# Patient Record
Sex: MALE | Race: WHITE | NOT HISPANIC OR LATINO | Employment: FULL TIME | URBAN - METROPOLITAN AREA
[De-identification: names, ages, dates, MRNs, and addresses within clinical notes are randomized per-mention and may not be internally consistent; named-entity substitution may affect disease eponyms.]

---

## 2021-08-18 ENCOUNTER — TRANSCRIBE ORDERS (OUTPATIENT)
Dept: ADMINISTRATIVE | Facility: HOSPITAL | Age: 51
End: 2021-08-18

## 2021-08-18 ENCOUNTER — LAB (OUTPATIENT)
Dept: LAB | Facility: HOSPITAL | Age: 51
End: 2021-08-18

## 2021-08-18 ENCOUNTER — HOSPITAL ENCOUNTER (OUTPATIENT)
Dept: CARDIOLOGY | Facility: HOSPITAL | Age: 51
Discharge: HOME | End: 2021-08-18

## 2021-08-18 DIAGNOSIS — N20.0 RENAL CALCULUS: ICD-10-CM

## 2021-08-18 DIAGNOSIS — B99.9 BLOOD INFECTION: ICD-10-CM

## 2021-08-18 DIAGNOSIS — N20.0 RENAL CALCULUS: Primary | ICD-10-CM

## 2021-08-18 DIAGNOSIS — B99.9 BLOOD INFECTION: Primary | ICD-10-CM

## 2021-08-18 LAB
ABO GROUP BLD: NORMAL
ANION GAP SERPL CALCULATED.3IONS-SCNC: 9.5 MMOL/L (ref 5–15)
BASOPHILS # BLD AUTO: 0.04 10*3/MM3 (ref 0–0.2)
BASOPHILS NFR BLD AUTO: 0.6 % (ref 0–1.5)
BLD GP AB SCN SERPL QL: NEGATIVE
BUN SERPL-MCNC: 18 MG/DL (ref 6–20)
BUN/CREAT SERPL: 17 (ref 7–25)
CALCIUM SPEC-SCNC: 9.7 MG/DL (ref 8.6–10.5)
CHLORIDE SERPL-SCNC: 103 MMOL/L (ref 98–107)
CO2 SERPL-SCNC: 25.5 MMOL/L (ref 22–29)
CREAT SERPL-MCNC: 1.06 MG/DL (ref 0.76–1.27)
DEPRECATED RDW RBC AUTO: 47.1 FL (ref 37–54)
EOSINOPHIL # BLD AUTO: 0.2 10*3/MM3 (ref 0–0.4)
EOSINOPHIL NFR BLD AUTO: 3.1 % (ref 0.3–6.2)
ERYTHROCYTE [DISTWIDTH] IN BLOOD BY AUTOMATED COUNT: 15.9 % (ref 12.3–15.4)
GFR SERPL CREATININE-BSD FRML MDRD: 73 ML/MIN/1.73
GLUCOSE SERPL-MCNC: 93 MG/DL (ref 65–99)
HCT VFR BLD AUTO: 38.2 % (ref 37.5–51)
HGB BLD-MCNC: 12.8 G/DL (ref 13–17.7)
IMM GRANULOCYTES # BLD AUTO: 0.02 10*3/MM3 (ref 0–0.05)
IMM GRANULOCYTES NFR BLD AUTO: 0.3 % (ref 0–0.5)
LYMPHOCYTES # BLD AUTO: 1.67 10*3/MM3 (ref 0.7–3.1)
LYMPHOCYTES NFR BLD AUTO: 26.1 % (ref 19.6–45.3)
MCH RBC QN AUTO: 27.6 PG (ref 26.6–33)
MCHC RBC AUTO-ENTMCNC: 33.5 G/DL (ref 31.5–35.7)
MCV RBC AUTO: 82.5 FL (ref 79–97)
MONOCYTES # BLD AUTO: 0.74 10*3/MM3 (ref 0.1–0.9)
MONOCYTES NFR BLD AUTO: 11.6 % (ref 5–12)
NEUTROPHILS NFR BLD AUTO: 3.73 10*3/MM3 (ref 1.7–7)
NEUTROPHILS NFR BLD AUTO: 58.3 % (ref 42.7–76)
NRBC BLD AUTO-RTO: 0 /100 WBC (ref 0–0.2)
PLATELET # BLD AUTO: 250 10*3/MM3 (ref 140–450)
PMV BLD AUTO: 9.9 FL (ref 6–12)
POTASSIUM SERPL-SCNC: 4 MMOL/L (ref 3.5–5.2)
RBC # BLD AUTO: 4.63 10*6/MM3 (ref 4.14–5.8)
RH BLD: POSITIVE
SODIUM SERPL-SCNC: 138 MMOL/L (ref 136–145)
T&S EXPIRATION DATE: NORMAL
WBC # BLD AUTO: 6.4 10*3/MM3 (ref 3.4–10.8)

## 2021-08-18 PROCEDURE — 86900 BLOOD TYPING SEROLOGIC ABO: CPT

## 2021-08-18 PROCEDURE — 86901 BLOOD TYPING SEROLOGIC RH(D): CPT

## 2021-08-18 PROCEDURE — 93005 ELECTROCARDIOGRAM TRACING: CPT | Performed by: UROLOGY

## 2021-08-18 PROCEDURE — 80048 BASIC METABOLIC PNL TOTAL CA: CPT

## 2021-08-18 PROCEDURE — 85025 COMPLETE CBC W/AUTO DIFF WBC: CPT

## 2021-08-18 PROCEDURE — 93010 ELECTROCARDIOGRAM REPORT: CPT | Performed by: INTERNAL MEDICINE

## 2021-08-18 PROCEDURE — 36415 COLL VENOUS BLD VENIPUNCTURE: CPT

## 2021-08-18 PROCEDURE — 87040 BLOOD CULTURE FOR BACTERIA: CPT

## 2021-08-18 PROCEDURE — 86850 RBC ANTIBODY SCREEN: CPT

## 2021-08-19 LAB — QT INTERVAL: 393 MS

## 2021-08-23 LAB
BACTERIA SPEC AEROBE CULT: NORMAL
BACTERIA SPEC AEROBE CULT: NORMAL

## 2021-08-25 PROCEDURE — 82365 CALCULUS SPECTROSCOPY: CPT | Performed by: UROLOGY

## 2021-08-26 ENCOUNTER — LAB REQUISITION (OUTPATIENT)
Dept: LAB | Facility: HOSPITAL | Age: 51
End: 2021-08-26

## 2021-08-26 DIAGNOSIS — N20.0 CALCULUS OF KIDNEY: ICD-10-CM

## 2021-09-01 LAB
CALCIUM OXALATE DIHYDRATE MFR STONE IR: 20 %
COLOR STONE: NORMAL
COM MFR STONE: 80 %
COMPN STONE: NORMAL
LABORATORY COMMENT REPORT: NORMAL
Lab: NORMAL
Lab: NORMAL
PHOTO: NORMAL
SIZE STONE: NORMAL MM
SPEC SOURCE SUBJ: NORMAL
STONE ANALYSIS-IMP: NORMAL
WT STONE: 1302 MG

## 2022-04-21 ENCOUNTER — OFFICE VISIT (OUTPATIENT)
Dept: CARDIOLOGY | Facility: CLINIC | Age: 52
End: 2022-04-21

## 2022-04-21 VITALS
DIASTOLIC BLOOD PRESSURE: 64 MMHG | BODY MASS INDEX: 37.19 KG/M2 | SYSTOLIC BLOOD PRESSURE: 101 MMHG | WEIGHT: 315 LBS | HEIGHT: 77 IN | HEART RATE: 104 BPM | OXYGEN SATURATION: 96 %

## 2022-04-21 DIAGNOSIS — I44.7 LBBB (LEFT BUNDLE BRANCH BLOCK): ICD-10-CM

## 2022-04-21 DIAGNOSIS — I10 PRIMARY HYPERTENSION: ICD-10-CM

## 2022-04-21 DIAGNOSIS — N20.0 RENAL CALCULUS: ICD-10-CM

## 2022-04-21 DIAGNOSIS — I42.0 DILATED CARDIOMYOPATHY: ICD-10-CM

## 2022-04-21 DIAGNOSIS — G47.33 OBSTRUCTIVE SLEEP APNEA SYNDROME: ICD-10-CM

## 2022-04-21 DIAGNOSIS — I48.19 PERSISTENT ATRIAL FIBRILLATION: Primary | ICD-10-CM

## 2022-04-21 PROBLEM — Z98.890 HISTORY OF CARDIAC CATHETERIZATION: Status: ACTIVE | Noted: 2022-04-18

## 2022-04-21 PROBLEM — Q23.1 BICUSPID AORTIC VALVE: Status: ACTIVE | Noted: 2021-11-29

## 2022-04-21 PROBLEM — I35.1 AORTIC VALVE REGURGITATION: Status: ACTIVE | Noted: 2021-09-09

## 2022-04-21 PROCEDURE — 99205 OFFICE O/P NEW HI 60 MIN: CPT | Performed by: INTERNAL MEDICINE

## 2022-04-21 PROCEDURE — 93000 ELECTROCARDIOGRAM COMPLETE: CPT | Performed by: INTERNAL MEDICINE

## 2022-04-21 RX ORDER — DILTIAZEM HYDROCHLORIDE 180 MG/1
180 CAPSULE, EXTENDED RELEASE ORAL DAILY
COMMUNITY
Start: 2022-03-09 | End: 2022-04-21

## 2022-04-21 RX ORDER — VENLAFAXINE HYDROCHLORIDE 75 MG/1
2 CAPSULE, EXTENDED RELEASE ORAL DAILY
COMMUNITY
Start: 2021-12-08

## 2022-04-21 RX ORDER — POTASSIUM CITRATE 15 MEQ/1
1 TABLET, EXTENDED RELEASE ORAL 2 TIMES DAILY
COMMUNITY
Start: 2022-03-25

## 2022-04-21 RX ORDER — SACUBITRIL AND VALSARTAN 24; 26 MG/1; MG/1
1 TABLET, FILM COATED ORAL 2 TIMES DAILY
COMMUNITY
Start: 2022-04-08

## 2022-04-21 RX ORDER — METOPROLOL SUCCINATE 100 MG/1
100 TABLET, EXTENDED RELEASE ORAL 2 TIMES DAILY
COMMUNITY
Start: 2022-03-16

## 2022-04-21 RX ORDER — ATORVASTATIN CALCIUM 20 MG/1
20 TABLET, FILM COATED ORAL DAILY
COMMUNITY
Start: 2022-04-05

## 2022-04-21 RX ORDER — APIXABAN 5 MG/1
5 TABLET, FILM COATED ORAL 2 TIMES DAILY
COMMUNITY
Start: 2022-04-05

## 2022-04-21 RX ORDER — FUROSEMIDE 40 MG/1
40 TABLET ORAL 2 TIMES DAILY
COMMUNITY
Start: 2022-04-08

## 2022-04-21 RX ORDER — AMIODARONE HYDROCHLORIDE 200 MG/1
200 TABLET ORAL 2 TIMES DAILY
COMMUNITY
Start: 2022-04-10 | End: 2022-04-21

## 2022-04-21 RX ORDER — PANTOPRAZOLE SODIUM 40 MG/1
40 TABLET, DELAYED RELEASE ORAL DAILY
COMMUNITY
Start: 2022-02-22 | End: 2022-04-21

## 2022-04-21 NOTE — PROGRESS NOTES
CC--cardiomyopathy, persistent atrial fibrillation    Sub  52-year-old male patient started developing symptomatic  atrial fibrillation last year.  He had atrial fibrillation for several years and started become persistent last year.  Patient underwent echocardiography last year back in July with EF more than 50% with a bicuspid aortic valve.  He underwent cardioversion x2 followed by ablation done in December by Dr. Duran and apparently had chest pain post ablation needing readmission following day and was found to have E. coli sepsis from the description though I do not have all the records.  He was briefly admitted after the ablation and further work-up was negative and was sent home.  He was reevaluated in the office of Dr Rice and patient was found to have recurrent persistent atrial fibrillation.  An echocardiogram done in December 2021 showed EF of 38% with ascending aorta measuring between 4.4-4.98 depending on transthoracic versus JUANJO.  He does have underlying bicuspid aortic valve.  A CT chest done in 12/21 revealed ascending aorta  aorta measuring up to 5 cm.    Report of CT chest attached below for reference.    Ct chest report --12/21    IMPRESSION:   Mild calcification of the thoracic aorta with aneurysmal dilatation of the   ascending thoracic aorta of 5 cm   No convincing CT evidence of pulmonary emboli.   Small right and trace left bilateral effusions with patchy bibasilar areas of   atelectasis/scar versus less infiltrate.   Scattered small mediastinal lymph nodes with calcified left hilar lymph nodes.   Mild diffuse fatty infiltration of the visualized liver.        Because of persistent atrial fibrillation and progressive heart failure symptoms patient underwent cardiac catheterization which showed EF of 25 to 30% without significant coronary artery stenosis with normal right-sided pressures.  Patient was started on amiodarone 2 weeks ago apart from Jardiance and spironolactone.  He continues  to have class III dyspnea symptoms        Past Medical History:   Diagnosis Date   • Atrial fibrillation (HCC)    • CHF (congestive heart failure) (HCC)    • Kidney stones      Past Surgical History:   Procedure Laterality Date   • CARDIAC ABLATION      [employer]   • CARDIAC CATHETERIZATION     • CYSTOSCOPY W/ LASER LITHOTRIPSY     • HERNIA REPAIR     • INNER EAR SURGERY     • TONSILLECTOMY       Family History   Problem Relation Age of Onset   • Arrhythmia Father    • Hyperlipidemia Father    • Hypertension Father      Social History     Tobacco Use   • Smoking status: Former Smoker     Types: Cigarettes     Quit date: 1/31/2007     Years since quitting: 15.2   • Smokeless tobacco: Never Used   Vaping Use   • Vaping Use: Never used   Substance Use Topics   • Alcohol use: Yes     Comment: occasionally   • Drug use: Not Currently     Allergies:  Patient has no known allergies.    Review of Systems   General:  positive for fatigue and tiredness  Eyes: No redness  Cardiovascular: No chest pain, no palpitations  Respiratory:   positive for class 3 shortness of breath  Gastrointestinal: No nausea or vomiting, bleeding  Genitourinary: no hematuria or dysuria  Musculoskeletal: No arthralgia or myalgia  Skin: No rash  Neurologic: No numbness, tingling, syncope  Hematologic/Lymphatic: No abnormal bleeding      Physical Exam  VITALS REVIEWED    General:      well developed, well nourished, in no acute distress.    Head:      normocephalic and atraumatic.    Eyes:      PERRL/EOM intact, conjunctivae and sclerae clear without nystagmus.    Neck:      no masses, thyromegaly,  trachea central with normal respiratory effort and PMI displaced laterally  Lungs:      clear bilaterally to auscultation.    Heart:       underlying  atrial fibrillation with irregularly irregular rhythm and  without any murmurs, gallops or rubs  Msk:      no deformity or scoliosis noted of thoracic or lumbar spine.    Pulses:      pulses normal in all 4  extremities.    Extremities:       no cyanosis or clubbing--trace left pedal edema and trace right pedal edema.    Neurologic:      no focal deficits.   alert oriented x3  Skin:      intact without lesions or rashes.    Psych:      alert and cooperative; normal mood and affect; normal attention span and concentration.            Assessment and plan      Complex patient with multiple medical problems    Records from [facility] and [facility], cardiology notes and multiple investigations were extensively reviewed on this patient.    Progressive LV dysfunction without significant coronary artery stenosis  Progressive interventricular conduction delay with left bundle branch block on ECG today with a normal QRS last year.  Normal EF back in July 21 progressed to LV dysfunction of EF of 38% in December 2021 and currently EF less than 30%.  Ongoing chronic class III systolic heart failure symptoms.  Obesity with obstructive sleep apnea on therapy.  Confounding factors with associated bicuspid aortic valve and ascending aorta measuring up to 5 cm in CT chest.  Essential hypertension controlled  Hyperlipidemia under treatment  [id number] score--- congestive heart failure--chronic class III, hypertension, ascending aorta disease--3    Recommendations    Stop amiodarone  Repeat cardioversion for symptomatic relief planned with cardiology next week  Possible consultation with CT surgery because of ascending aorta measurement of 5 cm with bicuspid aortic valve discussed with   Prior AF ablation back in December with recurrent persistent symptomatic atrial fibrillation  Reevaluate this patient closely in few weeks and considered for redo AF ablation to resume sinus rhythm and potentially improve  his LV systolic function  Continue LifeVest in the interim    Extensive discussion was done with the patient and the wife and extensive education done in the office    May end up needing a cardiac MRI to evaluate  for any infiltrative cardiomyopathy or granulomatous cardiomyopathy if his LV function is persistent and has progressive conduction system disease without improvement of EF despite being in sinus rhythm.      ECG 12 Lead    Date/Time: 4/21/2022 5:08 PM  Performed by: Eyad Campos MD  Authorized by: Eyad Campos MD   Comparison: compared with previous ECG   Comparison to previous ECG: Compared to  previous EKG when patient was in atrial fibrillation with normal QRS current EKG shows atrial fibrillation with left bundle branch block            Electronically signed by Eyad Campos MD, 04/21/22, 5:07 PM EDT.

## 2022-05-26 ENCOUNTER — OFFICE VISIT (OUTPATIENT)
Dept: CARDIOLOGY | Facility: CLINIC | Age: 52
End: 2022-05-26

## 2022-05-26 VITALS
DIASTOLIC BLOOD PRESSURE: 65 MMHG | RESPIRATION RATE: 18 BRPM | OXYGEN SATURATION: 97 % | WEIGHT: 315 LBS | HEART RATE: 70 BPM | BODY MASS INDEX: 38.36 KG/M2 | HEIGHT: 76 IN | SYSTOLIC BLOOD PRESSURE: 104 MMHG

## 2022-05-26 DIAGNOSIS — I44.7 LBBB (LEFT BUNDLE BRANCH BLOCK): ICD-10-CM

## 2022-05-26 DIAGNOSIS — I48.19 PERSISTENT ATRIAL FIBRILLATION: Primary | ICD-10-CM

## 2022-05-26 DIAGNOSIS — G47.33 OBSTRUCTIVE SLEEP APNEA SYNDROME: ICD-10-CM

## 2022-05-26 DIAGNOSIS — I10 PRIMARY HYPERTENSION: ICD-10-CM

## 2022-05-26 DIAGNOSIS — I42.0 DILATED CARDIOMYOPATHY: ICD-10-CM

## 2022-05-26 PROCEDURE — 93000 ELECTROCARDIOGRAM COMPLETE: CPT | Performed by: INTERNAL MEDICINE

## 2022-05-26 PROCEDURE — 99214 OFFICE O/P EST MOD 30 MIN: CPT | Performed by: INTERNAL MEDICINE

## 2022-05-26 NOTE — PROGRESS NOTES
CC--cardiomyopathy, persistent atrial fibrillation    Sub  52-year-old male patient started developing symptomatic  atrial fibrillation last year.  He had atrial fibrillation for several years and started become persistent last year.  Patient underwent echocardiography last year back in July with EF more than 50% with a bicuspid aortic valve.  He underwent cardioversion x2 followed by ablation done in December by Dr. Duran and apparently had chest pain post ablation needing readmission following day and was found to have E. coli sepsis from the description though I do not have all the records.  He was briefly admitted after the ablation and further work-up was negative and was sent home.  He was reevaluated in the office of Dr Rice and patient was found to have recurrent persistent atrial fibrillation.  An echocardiogram done in December 2021 showed EF of 38% with ascending aorta measuring between 4.4-4.98 depending on transthoracic versus JUANJO.  He does have underlying bicuspid aortic valve.  A CT chest done in 12/21 revealed ascending aorta  aorta measuring up to 5 cm.    Report of CT chest attached below for reference.    Ct chest report --12/21    IMPRESSION:   Mild calcification of the thoracic aorta with aneurysmal dilatation of the   ascending thoracic aorta of 5 cm   No convincing CT evidence of pulmonary emboli.   Small right and trace left bilateral effusions with patchy bibasilar areas of   atelectasis/scar versus less infiltrate.   Scattered small mediastinal lymph nodes with calcified left hilar lymph nodes.   Mild diffuse fatty infiltration of the visualized liver.        Because of persistent atrial fibrillation and progressive heart failure symptoms patient underwent cardiac catheterization which showed EF of 25 to 30% without significant coronary artery stenosis with normal right-sided pressures.  Patient's  amiodarone  Stopped and underwent cardioversion to sinus and feels better and is on  Jardiance and spironolactone.  He continues to have class 2 dyspnea symptoms        Past Medical History:   Diagnosis Date   • Atrial fibrillation (HCC)    • CHF (congestive heart failure) (HCC)    • Kidney stones      Past Surgical History:   Procedure Laterality Date   • CARDIAC ABLATION      Orthodox   • CARDIAC CATHETERIZATION     • CYSTOSCOPY W/ LASER LITHOTRIPSY     • HERNIA REPAIR     • INNER EAR SURGERY     • TONSILLECTOMY       Family History   Problem Relation Age of Onset   • Arrhythmia Father    • Hyperlipidemia Father    • Hypertension Father      Social History     Tobacco Use   • Smoking status: Former Smoker     Types: Cigarettes     Quit date: 1/31/2007     Years since quitting: 15.2   • Smokeless tobacco: Never Used   Vaping Use   • Vaping Use: Never used   Substance Use Topics   • Alcohol use: Yes     Comment: occasionally   • Drug use: Not Currently     Allergies:  Patient has no known allergies.    Review of Systems   General:  positive for fatigue and tiredness  Eyes: No redness  Cardiovascular: No chest pain, no palpitations  Respiratory:   positive for class 2 shortness of breath        Physical Exam  VITALS REVIEWED    General:      well developed, well nourished, in no acute distress.    Head:      normocephalic and atraumatic.    Eyes:      PERRL/EOM intact, conjunctivae and sclerae clear without nystagmus.    Neck:      no masses, thyromegaly,  trachea central with normal respiratory effort and PMI displaced laterally  Lungs:      clear bilaterally to auscultation.    Heart:       Sinus rhythm and  without any murmurs, gallops or rubs  Msk:      no deformity or scoliosis noted of thoracic or lumbar spine.    Pulses:      pulses normal in all 4 extremities.    Extremities:       no cyanosis or clubbing--trace left pedal edema and trace right pedal edema.    Neurologic:      no focal deficits.   alert oriented x3  Skin:      intact without lesions or rashes.    Psych:      alert and  cooperative; normal mood and affect; normal attention span and concentration.            Assessment and plan      Complex patient with multiple medical problems    Records from CT surgery reviewed  Progressive LV dysfunction without significant coronary artery stenosis  Progressive interventricular conduction delay with left bundle branch block on ECG today with a normal QRS last year.  Normal EF back in July 21 progressed to LV dysfunction of EF of 38% in December 2021 and currently EF less than 30%.  Ongoing chronic class III systolic heart failure symptoms.  Obesity with obstructive sleep apnea on therapy.  Confounding factors with associated bicuspid aortic valve and ascending aorta measuring up to 5 cm in CT chest.  Essential hypertension controlled  Hyperlipidemia under treatment  [id number] score--- congestive heart failure--chronic class III, hypertension, ascending aorta disease--3  Post cardioversion to sinus with improvement of sx  Prior AF ablation back in December with recurrent persistent symptomatic atrial fibrillation  Check EF in sinus  Continue LifeVest in the interim  check a cardiac MRI to evaluate for any infiltrative cardiomyopathy or granulomatous cardiomyopathy and has progressive conduction system disease   Decide on CRT depending on MRI EF      ECG 12 Lead    Date/Time: 5/26/2022 4:01 PM  Performed by: Eyad Campos MD  Authorized by: Eyad Campos MD   Comparison: compared with previous ECG   Comparison to previous ECG: AF changed to sinus  Rhythm: sinus rhythm  Conduction: left bundle branch block          Electronically signed by Eyad Campos MD, 05/26/22, 4:01 PM EDT.

## 2022-06-07 ENCOUNTER — TELEPHONE (OUTPATIENT)
Dept: CARDIOLOGY | Facility: CLINIC | Age: 52
End: 2022-06-07

## 2022-06-07 NOTE — TELEPHONE ENCOUNTER
Dr. Campos,    This patient has been scheduled for a Cardiac MRI on 06/13/2022 @ 9:45am.     Can you please place a Lab order for a BMP and contact patient when to go to the lab.    Thank you,    [facility]  [facility]     [phone]

## 2022-06-08 DIAGNOSIS — I50.9 CONGESTIVE HEART FAILURE, UNSPECIFIED HF CHRONICITY, UNSPECIFIED HEART FAILURE TYPE: ICD-10-CM

## 2022-06-08 DIAGNOSIS — E78.5 HYPERLIPIDEMIA, UNSPECIFIED HYPERLIPIDEMIA TYPE: ICD-10-CM

## 2022-06-08 DIAGNOSIS — I48.91 ATRIAL FIBRILLATION, UNSPECIFIED TYPE: Primary | ICD-10-CM

## 2022-06-13 ENCOUNTER — APPOINTMENT (OUTPATIENT)
Dept: MRI IMAGING | Facility: HOSPITAL | Age: 52
End: 2022-06-13

## 2022-06-15 ENCOUNTER — HOSPITAL ENCOUNTER (OUTPATIENT)
Dept: MRI IMAGING | Facility: HOSPITAL | Age: 52
Discharge: HOME | End: 2022-06-15
Admitting: INTERNAL MEDICINE

## 2022-06-15 PROCEDURE — 82565 ASSAY OF CREATININE: CPT

## 2022-06-15 PROCEDURE — 0 GADOBENATE DIMEGLUMINE 529 MG/ML SOLUTION: Performed by: INTERNAL MEDICINE

## 2022-06-15 PROCEDURE — A9577 INJ MULTIHANCE: HCPCS | Performed by: INTERNAL MEDICINE

## 2022-06-15 PROCEDURE — 75561 CARDIAC MRI FOR MORPH W/DYE: CPT

## 2022-06-15 PROCEDURE — 75561 CARDIAC MRI FOR MORPH W/DYE: CPT | Performed by: INTERNAL MEDICINE

## 2022-06-15 RX ADMIN — GADOBENATE DIMEGLUMINE 20 ML: 529 INJECTION, SOLUTION INTRAVENOUS at 10:41

## 2022-06-16 LAB — CREAT BLDA-MCNC: 1.3 MG/DL (ref 0.6–1.3)

## 2022-06-27 ENCOUNTER — APPOINTMENT (OUTPATIENT)
Dept: MRI IMAGING | Facility: HOSPITAL | Age: 52
End: 2022-06-27

## 2022-07-07 ENCOUNTER — OFFICE VISIT (OUTPATIENT)
Dept: CARDIOLOGY | Facility: CLINIC | Age: 52
End: 2022-07-07

## 2022-07-07 ENCOUNTER — PREP FOR SURGERY (OUTPATIENT)
Dept: OTHER | Facility: HOSPITAL | Age: 52
End: 2022-07-07

## 2022-07-07 VITALS
OXYGEN SATURATION: 96 % | SYSTOLIC BLOOD PRESSURE: 101 MMHG | BODY MASS INDEX: 38.36 KG/M2 | DIASTOLIC BLOOD PRESSURE: 66 MMHG | WEIGHT: 315 LBS | HEIGHT: 76 IN | HEART RATE: 70 BPM

## 2022-07-07 DIAGNOSIS — I42.0 DILATED CARDIOMYOPATHY: Primary | ICD-10-CM

## 2022-07-07 DIAGNOSIS — I44.7 LBBB (LEFT BUNDLE BRANCH BLOCK): ICD-10-CM

## 2022-07-07 DIAGNOSIS — I48.0 PAROXYSMAL ATRIAL FIBRILLATION: ICD-10-CM

## 2022-07-07 DIAGNOSIS — I10 PRIMARY HYPERTENSION: ICD-10-CM

## 2022-07-07 DIAGNOSIS — I50.9 CONGESTIVE HEART FAILURE, UNSPECIFIED HF CHRONICITY, UNSPECIFIED HEART FAILURE TYPE: ICD-10-CM

## 2022-07-07 PROCEDURE — 99214 OFFICE O/P EST MOD 30 MIN: CPT | Performed by: INTERNAL MEDICINE

## 2022-07-07 PROCEDURE — 93000 ELECTROCARDIOGRAM COMPLETE: CPT | Performed by: INTERNAL MEDICINE

## 2022-07-07 RX ORDER — SODIUM CHLORIDE 0.9 % (FLUSH) 0.9 %
3-10 SYRINGE (ML) INJECTION AS NEEDED
Status: CANCELLED | OUTPATIENT
Start: 2022-07-07

## 2022-07-07 RX ORDER — SODIUM CHLORIDE 0.9 % (FLUSH) 0.9 %
3 SYRINGE (ML) INJECTION EVERY 12 HOURS SCHEDULED
Status: CANCELLED | OUTPATIENT
Start: 2022-07-07

## 2022-07-07 NOTE — PROGRESS NOTES
CC--cardiomyopathy, persistent atrial fibrillation    Sub  52-year-old male patient started developing symptomatic  atrial fibrillation last year.  He had atrial fibrillation for several years and started become persistent last year.  Patient underwent echocardiography last year back in July with EF more than 50% with a bicuspid aortic valve.  He underwent cardioversion x2 followed by ablation done in December by Dr. Duran and apparently had chest pain post ablation needing readmission following day and was found to have E. coli sepsis from the description though I do not have all the records.  He was briefly admitted after the ablation and further work-up was negative and was sent home.  He was reevaluated in the office of Dr Rice and patient was found to have recurrent persistent atrial fibrillation.  An echocardiogram done in December 2021 showed EF of 38% with ascending aorta measuring between 4.4-4.98 depending on transthoracic versus JUANJO.  He does have underlying bicuspid aortic valve.  A CT chest done in 12/21 revealed ascending aorta  aorta measuring up to 5 cm.    Report of CT chest attached below for reference.    Ct chest report --12/21    IMPRESSION:   Mild calcification of the thoracic aorta with aneurysmal dilatation of the   ascending thoracic aorta of 5 cm   No convincing CT evidence of pulmonary emboli.   Small right and trace left bilateral effusions with patchy bibasilar areas of   atelectasis/scar versus less infiltrate.   Scattered small mediastinal lymph nodes with calcified left hilar lymph nodes.   Mild diffuse fatty infiltration of the visualized liver.        Because of persistent atrial fibrillation and progressive heart failure symptoms patient underwent cardiac catheterization which showed EF of 25 to 30% without significant coronary artery stenosis with normal right-sided pressures.  Patient's  amiodarone  Stopped and underwent cardioversion to sinus and feels better and is on  Jardiance and spironolactone.  He continues to have class 2 dyspnea symptoms        Past Medical History:   Diagnosis Date   • Atrial fibrillation (HCC)    • CHF (congestive heart failure) (HCC)    • Kidney stones      Past Surgical History:   Procedure Laterality Date   • CARDIAC ABLATION      [employer]   • CARDIAC CATHETERIZATION     • CYSTOSCOPY W/ LASER LITHOTRIPSY     • HERNIA REPAIR     • INNER EAR SURGERY     • TONSILLECTOMY       Family History   Problem Relation Age of Onset   • Arrhythmia Father    • Hyperlipidemia Father    • Hypertension Father      Social History     Tobacco Use   • Smoking status: Former Smoker     Types: Cigarettes     Quit date: 1/31/2007     Years since quitting: 15.2   • Smokeless tobacco: Never Used   Vaping Use   • Vaping Use: Never used   Substance Use Topics   • Alcohol use: Yes     Comment: occasionally   • Drug use: Not Currently     Allergies:  Patient has no known allergies.    Review of Systems   General:  positive for fatigue and tiredness  Eyes: No redness  Cardiovascular: No chest pain, no palpitations  Respiratory:   positive for class 2 shortness of breath        Physical Exam  VITALS REVIEWED    General:      well developed, well nourished, in no acute distress.    Head:      normocephalic and atraumatic.    Eyes:      PERRL/EOM intact, conjunctivae and sclerae clear without nystagmus.    Neck:      no masses, thyromegaly,  trachea central with normal respiratory effort and PMI displaced laterally  Lungs:      clear bilaterally to auscultation.    Heart:       Sinus rhythm and  without any murmurs, gallops or rubs  Msk:      no deformity or scoliosis noted of thoracic or lumbar spine.    Pulses:      pulses normal in all 4 extremities.    Extremities:       no cyanosis or clubbing--trace left pedal edema and trace right pedal edema.    Neurologic:      no focal deficits.   alert oriented x3  Skin:      intact without lesions or rashes.    Psych:      alert and  cooperative; normal mood and affect; normal attention span and concentration.            Assessment and plan      Complex patient with multiple medical problems    Records from CT surgery reviewed  Progressive LV dysfunction without significant coronary artery stenosis  Progressive interventricular conduction delay with left bundle branch block on ECG today with a normal QRS last year.  Normal EF back in July 21 progressed to LV dysfunction of EF of 38% in December 2021 and currently EF less than 30%.MRI--EF 36%  Ongoing chronic class III systolic heart failure symptoms.improved to class 2   Obesity with obstructive sleep apnea on therapy.  Confounding factors with associated bicuspid aortic valve and ascending aorta measuring up to 5 cm in CT chest.  Essential hypertension controlled  Hyperlipidemia under treatment  [id number] score--- congestive heart failure--chronic class III, hypertension, ascending aorta disease--3  Post cardioversion to sinus with improvement of sx  Prior AF ablation back in December with recurrent persistent symptomatic atrial fibrillation  Check EF in sinus  off LifeVest  Schedule patient for ep study and CRT_P  dw patient about Aziyo trial for the Cangaroo pouch      ECG 12 Lead    Date/Time: 7/7/2022 2:56 PM  Performed by: Eyad Campos MD  Authorized by: Eyad Campos MD   Comparison: compared with previous ECG   Similar to previous ECG  Rhythm: sinus rhythm  Rate: normal  Conduction: left bundle branch block          Electronically signed by Eyad Campos MD, 07/07/22, 2:56 PM EDT.

## 2022-07-07 NOTE — H&P (VIEW-ONLY)
CC--cardiomyopathy, persistent atrial fibrillation    Sub  52-year-old male patient started developing symptomatic  atrial fibrillation last year.  He had atrial fibrillation for several years and started become persistent last year.  Patient underwent echocardiography last year back in July with EF more than 50% with a bicuspid aortic valve.  He underwent cardioversion x2 followed by ablation done in December by Dr. Duran and apparently had chest pain post ablation needing readmission following day and was found to have E. coli sepsis from the description though I do not have all the records.  He was briefly admitted after the ablation and further work-up was negative and was sent home.  He was reevaluated in the office of Dr Rice and patient was found to have recurrent persistent atrial fibrillation.  An echocardiogram done in December 2021 showed EF of 38% with ascending aorta measuring between 4.4-4.98 depending on transthoracic versus JUANJO.  He does have underlying bicuspid aortic valve.  A CT chest done in 12/21 revealed ascending aorta  aorta measuring up to 5 cm.    Report of CT chest attached below for reference.    Ct chest report --12/21    IMPRESSION:   Mild calcification of the thoracic aorta with aneurysmal dilatation of the   ascending thoracic aorta of 5 cm   No convincing CT evidence of pulmonary emboli.   Small right and trace left bilateral effusions with patchy bibasilar areas of   atelectasis/scar versus less infiltrate.   Scattered small mediastinal lymph nodes with calcified left hilar lymph nodes.   Mild diffuse fatty infiltration of the visualized liver.        Because of persistent atrial fibrillation and progressive heart failure symptoms patient underwent cardiac catheterization which showed EF of 25 to 30% without significant coronary artery stenosis with normal right-sided pressures.  Patient's  amiodarone  Stopped and underwent cardioversion to sinus and feels better and is on  Jardiance and spironolactone.  He continues to have class 2 dyspnea symptoms        Past Medical History:   Diagnosis Date   • Atrial fibrillation (HCC)    • CHF (congestive heart failure) (HCC)    • Kidney stones      Past Surgical History:   Procedure Laterality Date   • CARDIAC ABLATION      [employer]   • CARDIAC CATHETERIZATION     • CYSTOSCOPY W/ LASER LITHOTRIPSY     • HERNIA REPAIR     • INNER EAR SURGERY     • TONSILLECTOMY       Family History   Problem Relation Age of Onset   • Arrhythmia Father    • Hyperlipidemia Father    • Hypertension Father      Social History     Tobacco Use   • Smoking status: Former Smoker     Types: Cigarettes     Quit date: 1/31/2007     Years since quitting: 15.2   • Smokeless tobacco: Never Used   Vaping Use   • Vaping Use: Never used   Substance Use Topics   • Alcohol use: Yes     Comment: occasionally   • Drug use: Not Currently     Allergies:  Patient has no known allergies.    Review of Systems   General:  positive for fatigue and tiredness  Eyes: No redness  Cardiovascular: No chest pain, no palpitations  Respiratory:   positive for class 2 shortness of breath        Physical Exam  VITALS REVIEWED    General:      well developed, well nourished, in no acute distress.    Head:      normocephalic and atraumatic.    Eyes:      PERRL/EOM intact, conjunctivae and sclerae clear without nystagmus.    Neck:      no masses, thyromegaly,  trachea central with normal respiratory effort and PMI displaced laterally  Lungs:      clear bilaterally to auscultation.    Heart:       Sinus rhythm and  without any murmurs, gallops or rubs  Msk:      no deformity or scoliosis noted of thoracic or lumbar spine.    Pulses:      pulses normal in all 4 extremities.    Extremities:       no cyanosis or clubbing--trace left pedal edema and trace right pedal edema.    Neurologic:      no focal deficits.   alert oriented x3  Skin:      intact without lesions or rashes.    Psych:      alert and  cooperative; normal mood and affect; normal attention span and concentration.            Assessment and plan      Complex patient with multiple medical problems    Records from CT surgery reviewed  Progressive LV dysfunction without significant coronary artery stenosis  Progressive interventricular conduction delay with left bundle branch block on ECG today with a normal QRS last year.  Normal EF back in July 21 progressed to LV dysfunction of EF of 38% in December 2021 and currently EF less than 30%.MRI--EF 36%  Ongoing chronic class III systolic heart failure symptoms.improved to class 2   Obesity with obstructive sleep apnea on therapy.  Confounding factors with associated bicuspid aortic valve and ascending aorta measuring up to 5 cm in CT chest.  Essential hypertension controlled  Hyperlipidemia under treatment  [id number] score--- congestive heart failure--chronic class III, hypertension, ascending aorta disease--3  Post cardioversion to sinus with improvement of sx  Prior AF ablation back in December with recurrent persistent symptomatic atrial fibrillation  Check EF in sinus  off LifeVest  Schedule patient for ep study and CRT_P  dw patient about Aziyo trial for the Cangaroo pouch      ECG 12 Lead    Date/Time: 7/7/2022 2:56 PM  Performed by: Eyad Campos MD  Authorized by: Eyad Campos MD   Comparison: compared with previous ECG   Similar to previous ECG  Rhythm: sinus rhythm  Rate: normal  Conduction: left bundle branch block          Electronically signed by Eyad Campos MD, 07/07/22, 2:56 PM EDT.

## 2022-07-12 PROBLEM — I42.0 DILATED CARDIOMYOPATHY: Status: ACTIVE | Noted: 2022-07-12

## 2022-07-12 PROBLEM — I44.7 LBBB (LEFT BUNDLE BRANCH BLOCK): Status: ACTIVE | Noted: 2022-07-12

## 2022-07-23 ENCOUNTER — LAB (OUTPATIENT)
Dept: LAB | Facility: HOSPITAL | Age: 52
End: 2022-07-23

## 2022-07-23 DIAGNOSIS — I44.7 LBBB (LEFT BUNDLE BRANCH BLOCK): ICD-10-CM

## 2022-07-23 DIAGNOSIS — I48.0 PAROXYSMAL ATRIAL FIBRILLATION: ICD-10-CM

## 2022-07-23 DIAGNOSIS — I50.9 CONGESTIVE HEART FAILURE, UNSPECIFIED HF CHRONICITY, UNSPECIFIED HEART FAILURE TYPE: ICD-10-CM

## 2022-07-23 DIAGNOSIS — I42.0 DILATED CARDIOMYOPATHY: ICD-10-CM

## 2022-07-23 LAB
ALBUMIN SERPL-MCNC: 4.1 G/DL (ref 3.5–5.2)
ALBUMIN/GLOB SERPL: 1.3 G/DL
ALP SERPL-CCNC: 98 U/L (ref 39–117)
ALT SERPL W P-5'-P-CCNC: 21 U/L (ref 1–41)
ANION GAP SERPL CALCULATED.3IONS-SCNC: 9.6 MMOL/L (ref 5–15)
AST SERPL-CCNC: 17 U/L (ref 1–40)
BASOPHILS # BLD AUTO: 0.03 10*3/MM3 (ref 0–0.2)
BASOPHILS NFR BLD AUTO: 0.6 % (ref 0–1.5)
BILIRUB SERPL-MCNC: 0.5 MG/DL (ref 0–1.2)
BUN SERPL-MCNC: 21 MG/DL (ref 6–20)
BUN/CREAT SERPL: 18.1 (ref 7–25)
CALCIUM SPEC-SCNC: 9.4 MG/DL (ref 8.6–10.5)
CHLORIDE SERPL-SCNC: 103 MMOL/L (ref 98–107)
CO2 SERPL-SCNC: 25.4 MMOL/L (ref 22–29)
CREAT SERPL-MCNC: 1.16 MG/DL (ref 0.76–1.27)
DEPRECATED RDW RBC AUTO: 43.7 FL (ref 37–54)
EGFRCR SERPLBLD CKD-EPI 2021: 75.3 ML/MIN/1.73
EOSINOPHIL # BLD AUTO: 0.24 10*3/MM3 (ref 0–0.4)
EOSINOPHIL NFR BLD AUTO: 4.5 % (ref 0.3–6.2)
ERYTHROCYTE [DISTWIDTH] IN BLOOD BY AUTOMATED COUNT: 13.8 % (ref 12.3–15.4)
GLOBULIN UR ELPH-MCNC: 3.2 GM/DL
GLUCOSE SERPL-MCNC: 94 MG/DL (ref 65–99)
HCT VFR BLD AUTO: 44.3 % (ref 37.5–51)
HGB BLD-MCNC: 14.6 G/DL (ref 13–17.7)
IMM GRANULOCYTES # BLD AUTO: 0.01 10*3/MM3 (ref 0–0.05)
IMM GRANULOCYTES NFR BLD AUTO: 0.2 % (ref 0–0.5)
LYMPHOCYTES # BLD AUTO: 1.34 10*3/MM3 (ref 0.7–3.1)
LYMPHOCYTES NFR BLD AUTO: 25.3 % (ref 19.6–45.3)
MAGNESIUM SERPL-MCNC: 2.4 MG/DL (ref 1.6–2.6)
MCH RBC QN AUTO: 29 PG (ref 26.6–33)
MCHC RBC AUTO-ENTMCNC: 33 G/DL (ref 31.5–35.7)
MCV RBC AUTO: 87.9 FL (ref 79–97)
MONOCYTES # BLD AUTO: 0.61 10*3/MM3 (ref 0.1–0.9)
MONOCYTES NFR BLD AUTO: 11.5 % (ref 5–12)
NEUTROPHILS NFR BLD AUTO: 3.07 10*3/MM3 (ref 1.7–7)
NEUTROPHILS NFR BLD AUTO: 57.9 % (ref 42.7–76)
NRBC BLD AUTO-RTO: 0 /100 WBC (ref 0–0.2)
PLATELET # BLD AUTO: 209 10*3/MM3 (ref 140–450)
PMV BLD AUTO: 10.1 FL (ref 6–12)
POTASSIUM SERPL-SCNC: 4.5 MMOL/L (ref 3.5–5.2)
PROT SERPL-MCNC: 7.3 G/DL (ref 6–8.5)
RBC # BLD AUTO: 5.04 10*6/MM3 (ref 4.14–5.8)
SARS-COV-2 ORF1AB RESP QL NAA+PROBE: NOT DETECTED
SODIUM SERPL-SCNC: 138 MMOL/L (ref 136–145)
WBC NRBC COR # BLD: 5.3 10*3/MM3 (ref 3.4–10.8)

## 2022-07-23 PROCEDURE — 85025 COMPLETE CBC W/AUTO DIFF WBC: CPT

## 2022-07-23 PROCEDURE — C9803 HOPD COVID-19 SPEC COLLECT: HCPCS

## 2022-07-23 PROCEDURE — 80053 COMPREHEN METABOLIC PANEL: CPT

## 2022-07-23 PROCEDURE — U0004 COV-19 TEST NON-CDC HGH THRU: HCPCS

## 2022-07-23 PROCEDURE — 36415 COLL VENOUS BLD VENIPUNCTURE: CPT

## 2022-07-23 PROCEDURE — 83735 ASSAY OF MAGNESIUM: CPT

## 2022-07-26 ENCOUNTER — ANESTHESIA EVENT (OUTPATIENT)
Dept: CARDIOLOGY | Facility: HOSPITAL | Age: 52
End: 2022-07-26

## 2022-07-26 ENCOUNTER — HOSPITAL ENCOUNTER (OUTPATIENT)
Facility: HOSPITAL | Age: 52
Setting detail: HOSPITAL OUTPATIENT SURGERY
Discharge: HOME | End: 2022-07-26
Attending: INTERNAL MEDICINE | Admitting: INTERNAL MEDICINE

## 2022-07-26 ENCOUNTER — ANESTHESIA (OUTPATIENT)
Dept: CARDIOLOGY | Facility: HOSPITAL | Age: 52
End: 2022-07-26

## 2022-07-26 VITALS
TEMPERATURE: 98.2 F | OXYGEN SATURATION: 92 % | HEART RATE: 59 BPM | SYSTOLIC BLOOD PRESSURE: 152 MMHG | DIASTOLIC BLOOD PRESSURE: 74 MMHG | HEIGHT: 76 IN | RESPIRATION RATE: 19 BRPM | WEIGHT: 315 LBS | BODY MASS INDEX: 38.36 KG/M2

## 2022-07-26 DIAGNOSIS — I44.7 LBBB (LEFT BUNDLE BRANCH BLOCK): ICD-10-CM

## 2022-07-26 DIAGNOSIS — I42.0 DILATED CARDIOMYOPATHY: ICD-10-CM

## 2022-07-26 DIAGNOSIS — I50.9 CONGESTIVE HEART FAILURE, UNSPECIFIED HF CHRONICITY, UNSPECIFIED HEART FAILURE TYPE: ICD-10-CM

## 2022-07-26 DIAGNOSIS — I48.0 PAROXYSMAL ATRIAL FIBRILLATION: ICD-10-CM

## 2022-07-26 PROCEDURE — 93622 COMP EP EVAL L VENTR PAC&REC: CPT | Performed by: INTERNAL MEDICINE

## 2022-07-26 PROCEDURE — C1894 INTRO/SHEATH, NON-LASER: HCPCS | Performed by: INTERNAL MEDICINE

## 2022-07-26 PROCEDURE — C1730 CATH, EP, 19 OR FEW ELECT: HCPCS | Performed by: INTERNAL MEDICINE

## 2022-07-26 PROCEDURE — 25010000002 PROPOFOL 200 MG/20ML EMULSION

## 2022-07-26 PROCEDURE — C1894 INTRO/SHEATH, NON-LASER: HCPCS

## 2022-07-26 PROCEDURE — 93620 COMP EP EVL R AT VEN PAC&REC: CPT | Performed by: INTERNAL MEDICINE

## 2022-07-26 PROCEDURE — 25010000002 MIDAZOLAM PER 1 MG

## 2022-07-26 PROCEDURE — 25010000002 PROPOFOL 10 MG/ML EMULSION

## 2022-07-26 PROCEDURE — 25010000002 FENTANYL CITRATE (PF) 100 MCG/2ML SOLUTION

## 2022-07-26 RX ORDER — HYDROCODONE BITARTRATE AND ACETAMINOPHEN 5; 325 MG/1; MG/1
1 TABLET ORAL EVERY 4 HOURS PRN
Status: DISCONTINUED | OUTPATIENT
Start: 2022-07-26 | End: 2022-07-26 | Stop reason: HOSPADM

## 2022-07-26 RX ORDER — MIDAZOLAM HYDROCHLORIDE 1 MG/ML
INJECTION INTRAMUSCULAR; INTRAVENOUS AS NEEDED
Status: DISCONTINUED | OUTPATIENT
Start: 2022-07-26 | End: 2022-07-26 | Stop reason: SURG

## 2022-07-26 RX ORDER — NALOXONE HCL 0.4 MG/ML
0.4 VIAL (ML) INJECTION
Status: DISCONTINUED | OUTPATIENT
Start: 2022-07-26 | End: 2022-07-26 | Stop reason: HOSPADM

## 2022-07-26 RX ORDER — ACETAMINOPHEN 325 MG/1
650 TABLET ORAL ONCE AS NEEDED
Status: DISCONTINUED | OUTPATIENT
Start: 2022-07-26 | End: 2022-07-26 | Stop reason: HOSPADM

## 2022-07-26 RX ORDER — ACETAMINOPHEN 325 MG/1
650 TABLET ORAL EVERY 4 HOURS PRN
Status: DISCONTINUED | OUTPATIENT
Start: 2022-07-26 | End: 2022-07-26 | Stop reason: HOSPADM

## 2022-07-26 RX ORDER — LIDOCAINE HYDROCHLORIDE 10 MG/ML
INJECTION, SOLUTION EPIDURAL; INFILTRATION; INTRACAUDAL; PERINEURAL AS NEEDED
Status: DISCONTINUED | OUTPATIENT
Start: 2022-07-26 | End: 2022-07-26 | Stop reason: SURG

## 2022-07-26 RX ORDER — ACETAMINOPHEN 650 MG/1
650 SUPPOSITORY RECTAL ONCE AS NEEDED
Status: DISCONTINUED | OUTPATIENT
Start: 2022-07-26 | End: 2022-07-26 | Stop reason: HOSPADM

## 2022-07-26 RX ORDER — OXYCODONE HYDROCHLORIDE 5 MG/1
5 TABLET ORAL ONCE AS NEEDED
Status: DISCONTINUED | OUTPATIENT
Start: 2022-07-26 | End: 2022-07-26 | Stop reason: HOSPADM

## 2022-07-26 RX ORDER — SODIUM CHLORIDE 0.9 % (FLUSH) 0.9 %
3 SYRINGE (ML) INJECTION EVERY 12 HOURS SCHEDULED
Status: DISCONTINUED | OUTPATIENT
Start: 2022-07-26 | End: 2022-07-26

## 2022-07-26 RX ORDER — NALOXONE HCL 0.4 MG/ML
0.4 VIAL (ML) INJECTION AS NEEDED
Status: DISCONTINUED | OUTPATIENT
Start: 2022-07-26 | End: 2022-07-26 | Stop reason: HOSPADM

## 2022-07-26 RX ORDER — PROPOFOL 10 MG/ML
INJECTION, EMULSION INTRAVENOUS AS NEEDED
Status: DISCONTINUED | OUTPATIENT
Start: 2022-07-26 | End: 2022-07-26 | Stop reason: SURG

## 2022-07-26 RX ORDER — ACETAMINOPHEN 650 MG/1
650 SUPPOSITORY RECTAL EVERY 4 HOURS PRN
Status: DISCONTINUED | OUTPATIENT
Start: 2022-07-26 | End: 2022-07-26 | Stop reason: HOSPADM

## 2022-07-26 RX ORDER — ONDANSETRON 2 MG/ML
4 INJECTION INTRAMUSCULAR; INTRAVENOUS EVERY 6 HOURS PRN
Status: DISCONTINUED | OUTPATIENT
Start: 2022-07-26 | End: 2022-07-26 | Stop reason: HOSPADM

## 2022-07-26 RX ORDER — OXYCODONE HYDROCHLORIDE 5 MG/1
10 TABLET ORAL EVERY 4 HOURS PRN
Status: DISCONTINUED | OUTPATIENT
Start: 2022-07-26 | End: 2022-07-26 | Stop reason: HOSPADM

## 2022-07-26 RX ORDER — ONDANSETRON 2 MG/ML
4 INJECTION INTRAMUSCULAR; INTRAVENOUS ONCE AS NEEDED
Status: DISCONTINUED | OUTPATIENT
Start: 2022-07-26 | End: 2022-07-26 | Stop reason: HOSPADM

## 2022-07-26 RX ORDER — FENTANYL CITRATE 50 UG/ML
INJECTION, SOLUTION INTRAMUSCULAR; INTRAVENOUS AS NEEDED
Status: DISCONTINUED | OUTPATIENT
Start: 2022-07-26 | End: 2022-07-26 | Stop reason: SURG

## 2022-07-26 RX ORDER — LIDOCAINE HYDROCHLORIDE 20 MG/ML
INJECTION, SOLUTION INFILTRATION; PERINEURAL AS NEEDED
Status: DISCONTINUED | OUTPATIENT
Start: 2022-07-26 | End: 2022-07-26 | Stop reason: HOSPADM

## 2022-07-26 RX ORDER — SODIUM CHLORIDE 0.9 % (FLUSH) 0.9 %
3-10 SYRINGE (ML) INJECTION AS NEEDED
Status: DISCONTINUED | OUTPATIENT
Start: 2022-07-26 | End: 2022-07-26 | Stop reason: HOSPADM

## 2022-07-26 RX ORDER — SODIUM CHLORIDE 9 MG/ML
INJECTION, SOLUTION INTRAVENOUS CONTINUOUS PRN
Status: DISCONTINUED | OUTPATIENT
Start: 2022-07-26 | End: 2022-07-26 | Stop reason: SURG

## 2022-07-26 RX ADMIN — PROPOFOL 100 MCG/KG/MIN: 10 INJECTION, EMULSION INTRAVENOUS at 11:49

## 2022-07-26 RX ADMIN — MIDAZOLAM 2 MG: 1 INJECTION, SOLUTION INTRAMUSCULAR; INTRAVENOUS at 11:48

## 2022-07-26 RX ADMIN — SODIUM CHLORIDE: 0.9 INJECTION, SOLUTION INTRAVENOUS at 11:44

## 2022-07-26 RX ADMIN — FENTANYL CITRATE 50 MCG: 50 INJECTION, SOLUTION INTRAMUSCULAR; INTRAVENOUS at 12:09

## 2022-07-26 RX ADMIN — PROPOFOL 80 MG: 10 INJECTION, EMULSION INTRAVENOUS at 11:48

## 2022-07-26 RX ADMIN — LIDOCAINE HYDROCHLORIDE 50 MG: 10 INJECTION, SOLUTION EPIDURAL; INFILTRATION; INTRACAUDAL; PERINEURAL at 11:48

## 2022-07-26 NOTE — ANESTHESIA POSTPROCEDURE EVALUATION
Patient: Chino Ferguson    Procedure Summary     Date: 07/26/22 Room / Location: [facility] [place] CATH LAB 3 / [facility] CATH INVASIVE LOCATION    Anesthesia Start: 1144 Anesthesia Stop: 1256    Procedure: EP/CRM Study (Right ) Diagnosis:       Dilated cardiomyopathy (HCC)      Paroxysmal atrial fibrillation (HCC)      LBBB (left bundle branch block)      Congestive heart failure, unspecified HF chronicity, unspecified heart failure type (HCC)      (Post EP study without complications)    Providers: Eyad Campos MD Provider: Brad Drake MD    Anesthesia Type: MAC ASA Status: 3          Anesthesia Type: MAC    Vitals  Vitals Value Taken Time   /72 07/26/22 1533   Temp     Pulse 66 07/26/22 1555   Resp     SpO2 97 % 07/26/22 1555   Vitals shown include unvalidated device data.        Post Anesthesia Care and Evaluation    Patient location during evaluation: PACU  Patient participation: complete - patient participated  Level of consciousness: confused  Pain scale: See nurse's notes for pain score.  Pain management: adequate    Airway patency: patent  Anesthetic complications: No anesthetic complications  PONV Status: none  Cardiovascular status: acceptable  Respiratory status: acceptable  Hydration status: acceptable    Comments: Patient seen and examined postoperatively; vital signs stable; SpO2 greater than or equal to 90%; cardiopulmonary status stable; nausea/vomiting adequately controlled; pain adequately controlled; no apparent anesthesia complications; patient discharged from anesthesia care when discharge criteria were met

## 2022-07-26 NOTE — ANESTHESIA PREPROCEDURE EVALUATION
Anesthesia Evaluation     Patient summary reviewed and Nursing notes reviewed   NPO Solid Status: > 8 hours  NPO Liquid Status: > 8 hours           Airway   Mallampati: II  TM distance: >3 FB  Neck ROM: full  No difficulty expected  Dental - normal exam     Pulmonary - normal exam   (+) sleep apnea on CPAP,   Cardiovascular - normal exam  Exercise tolerance: good (4-7 METS)    (+) dysrhythmias Paroxysmal Atrial Fib, CHF , hyperlipidemia,       Neuro/Psych  GI/Hepatic/Renal/Endo    (+) obesity,   renal disease,     Musculoskeletal     Abdominal  - normal exam    Bowel sounds: normal.   Substance History      OB/GYN          Other                        Anesthesia Plan    ASA 3     MAC     intravenous induction     Anesthetic plan, risks, benefits, and alternatives have been provided, discussed and informed consent has been obtained with: patient.        CODE STATUS:

## 2022-07-27 ENCOUNTER — TELEPHONE (OUTPATIENT)
Dept: CARDIOLOGY | Facility: CLINIC | Age: 52
End: 2022-07-27

## 2022-07-27 NOTE — TELEPHONE ENCOUNTER
Caller: RAUL RUIZ    Relationship: Emergency Contact    Best call back number: [phone]      Who is requesting this form or medical record from you: SELF    How would you like to receive the form or medical records (pick-up, mail, fax):  OR EMAIL    Timeframe paperwork needed: ASAP    Additional notes: RETURN TO WORK NOTE FOR PATIENT

## 2022-07-27 NOTE — TELEPHONE ENCOUNTER
----- Message from Eyad DRUMMOND MD sent at 7/27/2022 10:55 AM EDT -----  Return in 72 hours    Dr WISDOM  ----- Message -----  From: Lillian Brumfield MA  Sent: 7/27/2022  10:40 AM EDT  To: Eyad DRUMMOND MD    Pt wife called asking for a return to work date, she said you told the pt he can return to work in a couple days. How long should the patient be off for?

## 2022-07-28 DIAGNOSIS — I50.9 CONGESTIVE HEART FAILURE, UNSPECIFIED HF CHRONICITY, UNSPECIFIED HEART FAILURE TYPE: ICD-10-CM

## 2022-07-28 DIAGNOSIS — R50.9 FEVER, UNSPECIFIED FEVER CAUSE: Primary | ICD-10-CM

## (undated) DEVICE — Device: Brand: CARTO 3

## (undated) DEVICE — TBG PRESS/MONITOR FIX M/F LL A/ 24IN STRL

## (undated) DEVICE — ELECTRD DEFIB M/FUNC PROPADZ RADIOL 2PK

## (undated) DEVICE — Device: Brand: WEBSTER CS

## (undated) DEVICE — PINNACLE INTRODUCER SHEATH: Brand: PINNACLE

## (undated) DEVICE — PAD E/S GRND SGL/FOIL 9FT/CORD DISP

## (undated) DEVICE — Device: Brand: REFERENCE PATCH CARTO 3